# Patient Record
Sex: MALE | Race: ASIAN | NOT HISPANIC OR LATINO | URBAN - METROPOLITAN AREA
[De-identification: names, ages, dates, MRNs, and addresses within clinical notes are randomized per-mention and may not be internally consistent; named-entity substitution may affect disease eponyms.]

---

## 2020-08-04 ENCOUNTER — OFFICE (OUTPATIENT)
Dept: URBAN - METROPOLITAN AREA TELEHEALTH 12 | Facility: TELEHEALTH | Age: 70
End: 2020-08-04
Payer: COMMERCIAL

## 2020-08-04 VITALS — HEIGHT: 67 IN | WEIGHT: 165 LBS

## 2020-08-04 DIAGNOSIS — K21.0 GASTRO-ESOPHAGEAL REFLUX DISEASE WITH ESOPHAGITIS: ICD-10-CM

## 2020-08-04 DIAGNOSIS — K58.0 IRRITABLE BOWEL SYNDROME WITH DIARRHEA: ICD-10-CM

## 2020-08-04 DIAGNOSIS — R14.0 ABDOMINAL DISTENSION (GASEOUS): ICD-10-CM

## 2020-08-04 PROCEDURE — 99205 OFFICE O/P NEW HI 60 MIN: CPT | Mod: 95 | Performed by: INTERNAL MEDICINE

## 2020-08-04 RX ORDER — PANTOPRAZOLE SODIUM 40 MG/1
TABLET, DELAYED RELEASE ORAL
Qty: 60 | Refills: 3 | Status: ACTIVE
Start: 2020-08-04

## 2020-08-04 RX ORDER — HYOSCYAMINE SULFATE EXTENDED-RELEASE 0.38 MG/1
TABLET ORAL
Qty: 60 | Refills: 2 | Status: ACTIVE
Start: 2020-08-04

## 2020-08-04 NOTE — SERVICEHPINOTES
PATIENT VERIFIED BY DATE OF BIRTH AND NAME. Patient has been consented for this telecommunication visit.Mr. Baig is a 69 yo male originally from [facility], with PMhx of DM II, HTN, here for new consultation regarding bloating, abdominal pain, and diarrhea.  Ongoing for the past 1 month ever since eating a big mac burger at [email or web address].  After that, he felt LLQ abdominal pain, followed by nausea, cramping, then diarrhea.  No fevers or URI symptoms.  Per his report, he tested negative for COVID.  The pain continued and he went to his PCPs office and CT imaging ordered.  This was reportedly normal.  The pain continued so he was placed on Cipro and Flagyl, and his stool studies and blood work returned normal.  He was placed on Omeprazole and Bentyl, which "helped somewhat."  He saw a GI physician via TM, and is due to get EGD and colonoscopy next Friday in [place]. Since last week, he has increased his Omeprazole to 40 mg po BID and added on Gas-X.   The GERD symptoms have been still and issues.  No melena or BRBPR reported.  No malaise or weight loss.  There has a been weight gain and he admits to little exercise over the past few years. All 10 point review of systems have been reviewed as per HPI and otherwise negative. BR

## 2020-08-04 NOTE — SERVICENOTES
Patient's visit was conducted through video telecommunication. Patient consented before the start of visit as to understanding of privacy concerns, possible technological failure, and their responsibility of carrying out instructions of plan.

Time spent reviewing case with patient, symptoms, labs, and imaging exceeded 60 minutes

## 2021-10-07 ENCOUNTER — PREPPED CHART (OUTPATIENT)
Dept: URBAN - METROPOLITAN AREA CLINIC 67 | Facility: CLINIC | Age: 71
End: 2021-10-07

## 2021-10-07 PROBLEM — H35.363 MACULAR DRUSEN: Noted: 2021-10-07

## 2021-10-07 PROBLEM — H43.821 VITREOMACULAR ADHESION: Noted: 2021-10-07

## 2021-10-07 PROBLEM — H35.54 PATTERN DYSTROPHY: Noted: 2021-10-07

## 2022-04-05 ASSESSMENT — TONOMETRY
OD_IOP_MMHG: 17
OS_IOP_MMHG: 16

## 2022-04-05 ASSESSMENT — VISUAL ACUITY
OD_SC: 20/25+2
OS_SC: 20/20-3